# Patient Record
Sex: MALE | Race: WHITE | NOT HISPANIC OR LATINO | URBAN - METROPOLITAN AREA
[De-identification: names, ages, dates, MRNs, and addresses within clinical notes are randomized per-mention and may not be internally consistent; named-entity substitution may affect disease eponyms.]

---

## 2017-03-24 ENCOUNTER — APPOINTMENT (RX ONLY)
Dept: URBAN - METROPOLITAN AREA CLINIC 39 | Facility: CLINIC | Age: 62
Setting detail: DERMATOLOGY
End: 2017-03-24

## 2017-03-24 DIAGNOSIS — Q85.01 NEUROFIBROMATOSIS, TYPE 1: ICD-10-CM

## 2017-03-24 DIAGNOSIS — D36.1 BENIGN NEOPLASM OF PERIPHERAL NERVES AND AUTONOMIC NERVOUS SYSTEM: ICD-10-CM

## 2017-03-24 PROBLEM — K21.9 GASTRO-ESOPHAGEAL REFLUX DISEASE WITHOUT ESOPHAGITIS: Status: ACTIVE | Noted: 2017-03-24

## 2017-03-24 PROBLEM — I10 ESSENTIAL (PRIMARY) HYPERTENSION: Status: ACTIVE | Noted: 2017-03-24

## 2017-03-24 PROBLEM — K75.9 INFLAMMATORY LIVER DISEASE, UNSPECIFIED: Status: ACTIVE | Noted: 2017-03-24

## 2017-03-24 PROBLEM — D48.5 NEOPLASM OF UNCERTAIN BEHAVIOR OF SKIN: Status: ACTIVE | Noted: 2017-03-24

## 2017-03-24 PROCEDURE — ? BIOPSY BY SHAVE METHOD

## 2017-03-24 PROCEDURE — 99202 OFFICE O/P NEW SF 15 MIN: CPT | Mod: 25

## 2017-03-24 PROCEDURE — ? COUNSELING

## 2017-03-24 PROCEDURE — 11100: CPT

## 2017-03-24 PROCEDURE — 11101: CPT

## 2017-03-24 PROCEDURE — ? TREATMENT REGIMEN

## 2017-03-24 ASSESSMENT — LOCATION DETAILED DESCRIPTION DERM
LOCATION DETAILED: RIGHT INFRAMAMMARY CREASE (OUTER QUADRANT)
LOCATION DETAILED: XIPHOID
LOCATION DETAILED: RIGHT RIB CAGE
LOCATION DETAILED: SUPERIOR THORACIC SPINE

## 2017-03-24 ASSESSMENT — LOCATION SIMPLE DESCRIPTION DERM
LOCATION SIMPLE: UPPER BACK
LOCATION SIMPLE: RIGHT BREAST
LOCATION SIMPLE: ABDOMEN

## 2017-03-24 ASSESSMENT — LOCATION ZONE DERM: LOCATION ZONE: TRUNK

## 2017-03-24 NOTE — PROCEDURE: BIOPSY BY SHAVE METHOD
Render Post-Care Instructions In Note?: yes
Bill For Surgical Tray: no
Consent: Verbal consent was obtained and risks were reviewed including but not limited to scarring, infection, bleeding, scabbing, incomplete removal, nerve damage and allergy to anesthesia.
Type Of Destruction Used: Curettage
X Size Of Lesion In Cm: 0
Lab: [id number]
Notification Instructions: Patient will be notified of biopsy results. However, patient instructed to call the office if not contacted within 2 weeks.
Anesthesia Type: 1% lidocaine with epinephrine
Wound Care: Petrolatum
Billing Type: Third-Party Bill
Post-Care Instructions: I reviewed with the patient in detail post-care instructions. Patient is to keep the biopsy site dry overnight, and then apply bacitracin twice daily until healed. Patient may apply hydrogen peroxide soaks to remove any crusting.
Anesthesia Volume In Cc (Will Not Render If 0): 1
Biopsy Method: Personna blade
Dressing: pressure dressing with telfa
Lab Facility: [id number]
Detail Level: Detailed
Hemostasis: Drysol
Biopsy Type: H and E
Lab Facility: [id number]
Lab: [id number]
Billing Type: Third-Party Bill

## 2017-03-24 NOTE — PROCEDURE: TREATMENT REGIMEN
Detail Level: Simple
Plan: Discussed that due to symptomatic nature of lesions biopsied today (pain, inflammation, subject to recurrent trauma),as well as need to r/o any atypical lesions, biopsies are submitted to insurance.  However, discussed that if not covered (and discussed that we certainly cannot guarantee coverage), or if pt. has high deductible which has not been met, he could receive charges for up to $300/lesion biopsied ($150 for biopsy, $150 for pathology for each individual lesion).  Pt. and wife voiced understanding

## 2017-07-26 ENCOUNTER — HOSPITAL ENCOUNTER (OUTPATIENT)
Dept: HOSPITAL 61 - PCVCIMAG | Age: 62
Discharge: HOME | End: 2017-07-26
Attending: INTERNAL MEDICINE
Payer: COMMERCIAL

## 2017-07-26 DIAGNOSIS — I07.1: Primary | ICD-10-CM

## 2017-07-26 DIAGNOSIS — R94.31: ICD-10-CM

## 2017-07-26 DIAGNOSIS — I10: ICD-10-CM

## 2017-07-26 PROCEDURE — 93306 TTE W/DOPPLER COMPLETE: CPT

## 2017-07-26 NOTE — PCVCIMAG
--------------- APPROVED REPORT --------------





Study performed:  2017 10:59:16



EXAM: Comprehensive 2D, Doppler, and color-flow 

Echocardiogram

Patient Location: Echo lab

Status:  routine



Other Information 

Study Quality: Adequate



Risk Factors: 

Cardiac Risk Factors:  HTN, SOB



Indications

Abnormal ECG 

Dyspnea 



2D Dimensions

LVEF(%):  39.88 (&gt;50%)

IVSd:  13.26 (7-11mm)

LVDd:  46.11 mm

PWd:  11.90 (7-11mm)

LVDs:  37.19 (25-40mm)

Left Atrium:  43.99 (27-40mm)

Aortic Root:  31.69 mm

LV Single Plane 4CH:  65.35 %

LV Single Plane 2CH:  66.57 %[place]'s LVEF:  65.96 %

Biplane EF:  65.2 %



Volumes

Left Atrial Volume (Systole)

Single Plane 4CH:  109.90 mLSingle Plane 2CH:  92.95 mL

LA ESV Index:  44.00 mL/m2



Aortic Valve

AoV Peak Denys.:  1.75 m/s

AO Peak Gr.:  12.23 mmHgLVOT Max P.96 mmHg

LVOT Max V:  1.32 m/s



Mitral Valve

E/A Ratio:  0.8

MV Decel. Time:  276.35 ms

MV E Max Denys.:  1.15 m/s

MV A Denys.:  1.43 m/s

IVRT:  100.35 ms



Pulmonary Valve

PV Peak Denys.:  1.58 m/sPV Peak Gr.:  10.01 mmHg



Pulmonary Vein

P Vein S:    0.33 m/sP Vein A:  0.31 m/s

P Vein D:   0.41 m/sP Vein A Dur.:  124.6 msec

P Vein S/D Ratio:  0.80



Tricuspid Valve

TR Peak Denys.:  2.57 m/s

TR Peak Gr.:  26.46 mmHg



Left Ventricle

The left ventricle is normal size. There is normal LV segmental wall 

motion. Mild concentric left ventricular hypertrophy. Left 

ventricular systolic function is normal. The left ventricular 

ejection fraction is within the normal range. LVEF is 60-65%. Grade I 

- abnormal relaxation pattern.



Right Ventricle

The right ventricle is normal size. The right ventricular systolic 

function is normal.



Atria

Left atrium is moderately dilated. Right atrium is mildly 

dilated.



Aortic Valve

The aortic valve is normal in structure. No aortic regurgitation is 

present. There is no aortic valvular stenosis.



Mitral Valve

Mild mitral annular calcification. There is no mitral valve 

regurgitation noted. No evidence of mitral valve stenosis.



Tricuspid Valve

The tricuspid valve is normal in structure. Mild tricuspid 

regurgitation with PAP of 33 mmHg.



Pulmonic Valve

The pulmonary valve is normal in structure. There is no pulmonic 

valvular regurgitation.



Great Vessels

The aortic root is normal in size. IVC is normal in size and 

collapses with &gt;50% inspiration



Pericardium

There is no pericardial effusion.



&lt;Conclusion&gt;

The left ventricle is normal size.

LVEF is 60-65%.

Left atrium is moderately dilated.

Right atrium is mildly dilated.

The aortic valve is normal in structure.

Mild mitral annular calcification.

The tricuspid valve is normal in structure.

Mild tricuspid regurgitation with PAP of 33 mmHg.

The pulmonary valve is normal in structure.